# Patient Record
Sex: FEMALE | Race: WHITE | NOT HISPANIC OR LATINO
[De-identification: names, ages, dates, MRNs, and addresses within clinical notes are randomized per-mention and may not be internally consistent; named-entity substitution may affect disease eponyms.]

---

## 2022-08-15 DIAGNOSIS — Z83.3 FAMILY HISTORY OF DIABETES MELLITUS: ICD-10-CM

## 2022-08-15 DIAGNOSIS — I10 ESSENTIAL (PRIMARY) HYPERTENSION: ICD-10-CM

## 2022-08-15 DIAGNOSIS — Z82.61 FAMILY HISTORY OF ARTHRITIS: ICD-10-CM

## 2022-08-15 DIAGNOSIS — Z86.73 PERSONAL HISTORY OF TRANSIENT ISCHEMIC ATTACK (TIA), AND CEREBRAL INFARCTION W/OUT RESIDUAL DEFICITS: ICD-10-CM

## 2022-08-15 DIAGNOSIS — M19.90 UNSPECIFIED OSTEOARTHRITIS, UNSPECIFIED SITE: ICD-10-CM

## 2022-08-15 DIAGNOSIS — Z98.890 OTHER SPECIFIED POSTPROCEDURAL STATES: ICD-10-CM

## 2022-08-15 DIAGNOSIS — Z82.49 FAMILY HISTORY OF ISCHEMIC HEART DISEASE AND OTHER DISEASES OF THE CIRCULATORY SYSTEM: ICD-10-CM

## 2022-08-15 DIAGNOSIS — Q82.8 OTHER SPECIFIED CONGENITAL MALFORMATIONS OF SKIN: ICD-10-CM

## 2022-08-15 DIAGNOSIS — M33.20 POLYMYOSITIS, ORGAN INVOLVEMENT UNSPECIFIED: ICD-10-CM

## 2022-08-15 PROBLEM — Z00.00 ENCOUNTER FOR PREVENTIVE HEALTH EXAMINATION: Status: ACTIVE | Noted: 2022-08-15

## 2022-08-15 RX ORDER — ROSUVASTATIN CALCIUM 5 MG/1
5 TABLET, FILM COATED ORAL
Refills: 0 | Status: ACTIVE | COMMUNITY

## 2022-08-15 RX ORDER — PRAVASTATIN SODIUM 80 MG/1
80 TABLET ORAL
Refills: 0 | Status: ACTIVE | COMMUNITY

## 2022-08-15 RX ORDER — INSULIN GLARGINE 100 [IU]/ML
100 INJECTION, SOLUTION SUBCUTANEOUS
Refills: 0 | Status: ACTIVE | COMMUNITY

## 2022-08-15 RX ORDER — CARVEDILOL 25 MG/1
25 TABLET, FILM COATED ORAL
Refills: 0 | Status: ACTIVE | COMMUNITY

## 2022-08-15 RX ORDER — LISINOPRIL 20 MG/1
20 TABLET ORAL
Refills: 0 | Status: ACTIVE | COMMUNITY

## 2022-08-15 RX ORDER — GABAPENTIN 300 MG
300 TABLET ORAL
Refills: 0 | Status: ACTIVE | COMMUNITY

## 2022-08-15 RX ORDER — AMLODIPINE BESYLATE 10 MG/1
10 TABLET ORAL
Refills: 0 | Status: ACTIVE | COMMUNITY

## 2022-08-15 RX ORDER — HYDROCHLOROTHIAZIDE 25 MG/1
25 TABLET ORAL
Refills: 0 | Status: ACTIVE | COMMUNITY

## 2022-08-15 RX ORDER — INSULIN LISPRO 100 [IU]/ML
100 INJECTION, SOLUTION INTRAVENOUS; SUBCUTANEOUS
Refills: 0 | Status: ACTIVE | COMMUNITY

## 2022-08-25 ENCOUNTER — APPOINTMENT (OUTPATIENT)
Dept: PODIATRY | Facility: CLINIC | Age: 77
End: 2022-08-25

## 2023-02-20 ENCOUNTER — OFFICE (OUTPATIENT)
Dept: URBAN - METROPOLITAN AREA CLINIC 90 | Facility: CLINIC | Age: 78
Setting detail: OPHTHALMOLOGY
End: 2023-02-20
Payer: MEDICARE

## 2023-02-20 DIAGNOSIS — H43.393: ICD-10-CM

## 2023-02-20 DIAGNOSIS — H40.013: ICD-10-CM

## 2023-02-20 DIAGNOSIS — E11.9: ICD-10-CM

## 2023-02-20 DIAGNOSIS — H43.811: ICD-10-CM

## 2023-02-20 DIAGNOSIS — H35.373: ICD-10-CM

## 2023-02-20 DIAGNOSIS — H16.223: ICD-10-CM

## 2023-02-20 PROCEDURE — 92014 COMPRE OPH EXAM EST PT 1/>: CPT | Performed by: OPHTHALMOLOGY

## 2023-02-20 ASSESSMENT — REFRACTION_MANIFEST
OD_AXIS: 105
OD_SPHERE: +0.25
OS_CYLINDER: -0.75
OD_VA1: 20/20-
OD_VA2: 20/20
OD_ADD: +2.50
OS_CYLINDER: -0.50
OD_AXIS: 102
OD_CYLINDER: -1.50
OD_SPHERE: +0.50
OS_ADD: +2.50
OD_SPHERE: +0.75
OD_AXIS: 105
OS_CYLINDER: -0.50
OS_AXIS: 086
OS_SPHERE: PLANO
OS_SPHERE: PLANO
OD_CYLINDER: -1.25
OS_VA2: 20/25(J1)
OD_CYLINDER: -1.75
OS_AXIS: 090
OS_AXIS: 080
OD_CYLINDER: -1.25
OD_VA2: 20/25(J1)
OS_VA1: 20/20
OS_AXIS: 090
OS_SPHERE: +0.25
OD_VA1: 20/25
OD_VA1: 20/20
OD_AXIS: 100
OS_VA2: 20/J1
OD_SPHERE: +0.50
OD_ADD: +2.50
OD_ADD: +2.75
OS_VA1: 20/20
OS_CYLINDER: -0.75
OS_VA2: 20/20
OS_VA1: 20/20
OS_ADD: +2.75
OS_SPHERE: PLANO
OD_VA2: 20/J1
OS_ADD: +2.50

## 2023-02-20 ASSESSMENT — AXIALLENGTH_DERIVED
OD_AL: 24.6502
OD_AL: 24.5972
OS_AL: 24.4917
OD_AL: 24.6502
OD_AL: 24.5972
OS_AL: 24.6502
OD_AL: 24.8647

## 2023-02-20 ASSESSMENT — REFRACTION_CURRENTRX
OS_VPRISM_DIRECTION: PROGS
OS_VPRISM_DIRECTION: PROGS
OS_SPHERE: PLANO
OD_AXIS: 101
OD_CYLINDER: -1.25
OS_AXIS: 085
OD_VPRISM_DIRECTION: PROGS
OS_CYLINDER: -0.50
OD_OVR_VA: 20/
OD_SPHERE: +0.50
OD_ADD: +2.50
OD_SPHERE: +0.50
OD_ADD: +2.75
OS_OVR_VA: 20/
OS_ADD: +2.75
OD_CYLINDER: -1.25
OS_SPHERE: +0.25
OD_VPRISM_DIRECTION: PROGS
OS_CYLINDER: 0.00
OS_AXIS: 180
OS_OVR_VA: 20/
OS_ADD: +2.50
OD_AXIS: 104
OD_OVR_VA: 20/

## 2023-02-20 ASSESSMENT — SPHEQUIV_DERIVED
OS_SPHEQUIV: 0.25
OD_SPHEQUIV: 0
OD_SPHEQUIV: -0.625
OS_SPHEQUIV: -0.125
OD_SPHEQUIV: -0.125
OD_SPHEQUIV: -0.125
OD_SPHEQUIV: 0

## 2023-02-20 ASSESSMENT — DRY EYES - PHYSICIAN NOTES
OD_GENERALCOMMENTS: INFERIORLY
OS_GENERALCOMMENTS: INFERIORLY

## 2023-02-20 ASSESSMENT — PACHYMETRY
OD_CT_CORRECTION: 1
OS_CT_CORRECTION: 1
OD_CT_UM: 526
OS_CT_UM: 525

## 2023-02-20 ASSESSMENT — LID EXAM ASSESSMENTS
OS_BLEPHARITIS: LLL T 1+
OD_BLEPHARITIS: RLL T 1+

## 2023-02-20 ASSESSMENT — KERATOMETRY
OD_K1POWER_DIOPTERS: 40.50
METHOD_AUTO_MANUAL: AUTO
OD_AXISANGLE_DEGREES: 015
OS_K1POWER_DIOPTERS: 40.50
OS_K2POWER_DIOPTERS: 41.25
OS_AXISANGLE_DEGREES: 169
OD_K2POWER_DIOPTERS: 41.25

## 2023-02-20 ASSESSMENT — TONOMETRY
OS_IOP_MMHG: 20
OD_IOP_MMHG: 20

## 2023-02-20 ASSESSMENT — REFRACTION_AUTOREFRACTION
OS_AXIS: 092
OD_AXIS: 107
OS_CYLINDER: -1.50
OD_CYLINDER: -2.00
OS_SPHERE: +1.00
OD_SPHERE: +1.00

## 2023-02-20 ASSESSMENT — SUPERFICIAL PUNCTATE KERATITIS (SPK)
OS_SPK: 1+
OD_SPK: 1+

## 2023-02-20 ASSESSMENT — TEAR BREAK UP TIME (TBUT)
OD_TBUT: T
OS_TBUT: T

## 2023-02-20 ASSESSMENT — VISUAL ACUITY
OS_BCVA: 20/20
OD_BCVA: 20/30-1

## 2023-02-20 ASSESSMENT — CONFRONTATIONAL VISUAL FIELD TEST (CVF)
OS_FINDINGS: FULL
OD_FINDINGS: FULL

## 2023-05-26 ENCOUNTER — APPOINTMENT (OUTPATIENT)
Dept: PODIATRY | Facility: CLINIC | Age: 78
End: 2023-05-26
Payer: MEDICARE

## 2023-05-26 DIAGNOSIS — M33.20 POLYMYOSITIS, ORGAN INVOLVEMENT UNSPECIFIED: ICD-10-CM

## 2023-05-26 PROCEDURE — 11721 DEBRIDE NAIL 6 OR MORE: CPT

## 2023-05-26 RX ORDER — METHOTREXATE 2.5 MG/1
TABLET ORAL
Refills: 0 | Status: ACTIVE | COMMUNITY

## 2023-06-05 NOTE — PHYSICAL EXAM
[Ankle Swelling (On Exam)] : present [Ankle Swelling Bilaterally] : bilaterally  [1+] : left foot dorsalis pedis 1+ [FreeTextEntry3] : CFT: 3 seconds x10. Negative hair growth. Atrophic dry skin bilaterally.  [FreeTextEntry4] : Decreased vibratory at the 1st MPJ's and medial malleoli. [FreeTextEntry8] : Decreased vibratory at the 1st MPJ's and medial malleoli. [FreeTextEntry1] : [place]-Susan monofilament testing dimiinshed at the hallux bilaterally and lesser digits bilaterally.

## 2023-06-05 NOTE — ASSESSMENT
[FreeTextEntry1] : \par Impression: Onychomycosis x10. Diabetic with peripheral neuropathy.\par \par Treatment: Patient was educated on findings and conditions. With patient's consent all nails were prepped and manually and mechanically debrided to patient's tolerance without incidence. Discussed antifungal topical medications with the patient as an option for treatment. I would not recommend oral or laser at this time. Patient would like to try topical medications, antifungal Ciclopirox solution was sent to the pharmacy and instructions on use were given. She was advised to soak all nails in a one to one mixture of white vinegar and water and cleanse for approximately 10 to 15 minutes every day. Cleanse lightly and gently with a nail brush and dry well. Apply antifungal medications in the evenings. Patient is to avoid walking barefoot. Proper shoe gear was discussed with the patient. Any problems or concerns she is to contact the office.

## 2023-07-25 ENCOUNTER — OFFICE (OUTPATIENT)
Dept: URBAN - METROPOLITAN AREA CLINIC 90 | Facility: CLINIC | Age: 78
Setting detail: OPHTHALMOLOGY
End: 2023-07-25
Payer: MEDICARE

## 2023-07-25 DIAGNOSIS — H40.013: ICD-10-CM

## 2023-07-25 DIAGNOSIS — H18.593: ICD-10-CM

## 2023-07-25 DIAGNOSIS — H43.811: ICD-10-CM

## 2023-07-25 DIAGNOSIS — H01.002: ICD-10-CM

## 2023-07-25 DIAGNOSIS — H43.393: ICD-10-CM

## 2023-07-25 DIAGNOSIS — H35.373: ICD-10-CM

## 2023-07-25 DIAGNOSIS — H16.223: ICD-10-CM

## 2023-07-25 DIAGNOSIS — H01.005: ICD-10-CM

## 2023-07-25 DIAGNOSIS — E11.9: ICD-10-CM

## 2023-07-25 DIAGNOSIS — H18.591: ICD-10-CM

## 2023-07-25 DIAGNOSIS — H18.592: ICD-10-CM

## 2023-07-25 PROCEDURE — 92083 EXTENDED VISUAL FIELD XM: CPT | Performed by: OPHTHALMOLOGY

## 2023-07-25 PROCEDURE — 92250 FUNDUS PHOTOGRAPHY W/I&R: CPT | Performed by: OPHTHALMOLOGY

## 2023-07-25 PROCEDURE — 92014 COMPRE OPH EXAM EST PT 1/>: CPT | Performed by: OPHTHALMOLOGY

## 2023-07-25 ASSESSMENT — REFRACTION_MANIFEST
OS_VA2: 20/20
OD_AXIS: 102
OS_SPHERE: PLANO
OD_ADD: +2.75
OS_ADD: +2.50
OD_ADD: +2.50
OS_AXIS: 090
OS_CYLINDER: -0.50
OD_CYLINDER: -1.25
OD_AXIS: 105
OD_CYLINDER: -1.75
OS_VA1: 20/20
OS_AXIS: 090
OS_AXIS: 080
OD_AXIS: 100
OD_CYLINDER: -1.50
OD_SPHERE: +0.75
OD_VA1: 20/25
OD_VA2: 20/J1
OD_SPHERE: +0.50
OD_SPHERE: +0.25
OS_AXIS: 086
OD_VA2: 20/25(J1)
OS_SPHERE: +0.25
OS_VA2: 20/25(J1)
OD_SPHERE: +0.50
OS_ADD: +2.75
OS_VA2: 20/J1
OD_CYLINDER: -1.75
OS_VA2: 20/20
OS_SPHERE: PLANO
OS_ADD: +2.75
OD_ADD: +2.75
OD_VA2: 20/20
OD_AXIS: 105
OD_CYLINDER: -1.25
OD_ADD: +2.50
OS_CYLINDER: -0.50
OS_CYLINDER: -0.75
OD_VA1: 20/20-
OS_VA1: 20/20
OD_VA1: 20/20-
OS_AXIS: 080
OS_SPHERE: PLANO
OS_VA1: 20/20
OS_CYLINDER: -0.75
OS_ADD: +2.50
OD_VA1: 20/20
OS_VA1: 20/20
OD_AXIS: 100
OS_SPHERE: PLANO
OD_SPHERE: +0.25
OD_VA2: 20/20
OS_CYLINDER: -0.75

## 2023-07-25 ASSESSMENT — SPHEQUIV_DERIVED
OS_SPHEQUIV: -0.125
OD_SPHEQUIV: -0.125
OD_SPHEQUIV: -0.625
OD_SPHEQUIV: 0
OD_SPHEQUIV: -0.125
OS_SPHEQUIV: 0.125
OD_SPHEQUIV: 0
OD_SPHEQUIV: -0.625

## 2023-07-25 ASSESSMENT — REFRACTION_AUTOREFRACTION
OD_SPHERE: +0.75
OS_CYLINDER: -1.25
OD_AXIS: 105
OS_SPHERE: +0.75
OD_CYLINDER: -1.50
OS_AXIS: 088

## 2023-07-25 ASSESSMENT — LID EXAM ASSESSMENTS
OD_COMMENTS: NO FOREIGN BODY, NO CONCRETIONS/CALCIFICATIONS.
OD_COMMENTS: LID EVERSION RUL
OS_BLEPHARITIS: LLL T 1+
OD_BLEPHARITIS: RLL T 1+

## 2023-07-25 ASSESSMENT — REFRACTION_CURRENTRX
OD_AXIS: 101
OS_VPRISM_DIRECTION: PROGS
OS_OVR_VA: 20/
OD_OVR_VA: 20/
OD_SPHERE: +0.50
OD_CYLINDER: -1.25
OD_ADD: +2.75
OD_CYLINDER: -1.25
OD_VPRISM_DIRECTION: PROGS
OS_OVR_VA: 20/
OS_VPRISM_DIRECTION: PROGS
OS_ADD: +2.50
OS_ADD: +2.75
OS_SPHERE: PLANO
OD_VPRISM_DIRECTION: PROGS
OS_AXIS: 180
OD_ADD: +2.50
OD_OVR_VA: 20/
OS_CYLINDER: -0.50
OS_SPHERE: +0.25
OS_AXIS: 085
OD_SPHERE: +0.50
OS_CYLINDER: 0.00
OD_AXIS: 104

## 2023-07-25 ASSESSMENT — SUPERFICIAL PUNCTATE KERATITIS (SPK)
OD_SPK: 1+
OS_SPK: 1+

## 2023-07-25 ASSESSMENT — KERATOMETRY
OS_K1POWER_DIOPTERS: 40.25
OS_AXISANGLE_DEGREES: 168
OD_AXISANGLE_DEGREES: 017
OS_K2POWER_DIOPTERS: 41.00
OD_K1POWER_DIOPTERS: 40.50
OD_K2POWER_DIOPTERS: 41.25
METHOD_AUTO_MANUAL: AUTO

## 2023-07-25 ASSESSMENT — DRY EYES - PHYSICIAN NOTES
OD_GENERALCOMMENTS: INFERIORLY
OS_GENERALCOMMENTS: INFERIORLY

## 2023-07-25 ASSESSMENT — TEAR BREAK UP TIME (TBUT)
OD_TBUT: T
OS_TBUT: T

## 2023-07-25 ASSESSMENT — PACHYMETRY
OD_CT_CORRECTION: 1
OD_CT_UM: 526
OS_CT_UM: 525
OS_CT_CORRECTION: 1

## 2023-07-25 ASSESSMENT — TONOMETRY
OD_IOP_MMHG: 20
OS_IOP_MMHG: 20

## 2023-07-25 ASSESSMENT — VISUAL ACUITY
OD_BCVA: 20/25
OS_BCVA: 20/25-

## 2023-07-25 ASSESSMENT — AXIALLENGTH_DERIVED
OD_AL: 24.8647
OS_AL: 24.6441
OD_AL: 24.5972
OD_AL: 24.6502
OD_AL: 24.5972
OD_AL: 24.8647
OS_AL: 24.7509
OD_AL: 24.6502

## 2023-07-25 ASSESSMENT — CONFRONTATIONAL VISUAL FIELD TEST (CVF)
OD_FINDINGS: FULL
OS_FINDINGS: FULL

## 2023-10-16 ENCOUNTER — APPOINTMENT (OUTPATIENT)
Dept: PODIATRY | Facility: CLINIC | Age: 78
End: 2023-10-16
Payer: MEDICARE

## 2023-10-16 DIAGNOSIS — S90.522A BLISTER (NONTHERMAL), LEFT ANKLE, INITIAL ENCOUNTER: ICD-10-CM

## 2023-10-16 DIAGNOSIS — M25.571 PAIN IN RIGHT ANKLE AND JOINTS OF RIGHT FOOT: ICD-10-CM

## 2023-10-16 DIAGNOSIS — M79.671 PAIN IN RIGHT FOOT: ICD-10-CM

## 2023-10-16 DIAGNOSIS — L03.115 CELLULITIS OF RIGHT LOWER LIMB: ICD-10-CM

## 2023-10-16 PROCEDURE — 73610 X-RAY EXAM OF ANKLE: CPT | Mod: RT

## 2023-10-16 PROCEDURE — 99212 OFFICE O/P EST SF 10 MIN: CPT | Mod: 25

## 2023-10-19 PROBLEM — M79.671 RIGHT FOOT PAIN: Status: ACTIVE | Noted: 2023-10-19

## 2023-10-19 PROBLEM — M25.571 ACUTE RIGHT ANKLE PAIN: Status: ACTIVE | Noted: 2023-10-19

## 2023-10-19 LAB — GRAM STN SPEC: NORMAL

## 2023-10-24 LAB — BACTERIA WND CULT: NORMAL

## 2023-11-27 ENCOUNTER — APPOINTMENT (OUTPATIENT)
Dept: PODIATRY | Facility: CLINIC | Age: 78
End: 2023-11-27
Payer: MEDICARE

## 2023-11-27 PROCEDURE — 11721 DEBRIDE NAIL 6 OR MORE: CPT

## 2024-02-26 ENCOUNTER — APPOINTMENT (OUTPATIENT)
Dept: PODIATRY | Facility: CLINIC | Age: 79
End: 2024-02-26
Payer: MEDICARE

## 2024-02-26 DIAGNOSIS — L85.3 XEROSIS CUTIS: ICD-10-CM

## 2024-02-26 PROCEDURE — 11721 DEBRIDE NAIL 6 OR MORE: CPT

## 2024-02-26 PROCEDURE — 99212 OFFICE O/P EST SF 10 MIN: CPT | Mod: 25

## 2024-02-28 PROBLEM — L85.3 XEROSIS CUTIS: Status: ACTIVE | Noted: 2024-02-28

## 2024-03-01 NOTE — ASSESSMENT
[FreeTextEntry1] : Impression: Diabetic with diabetic neuropathy. Onychomycosis. Xerosis.  Treatment: Discussed findings and conditions with the patient. The patient states she is unable to soak her toes in white vinegar at this time due to the discomfort in her back. She is ambulating with a cane. With patient's consent, all nails were prepped and manually and mechanically debrided to patient's tolerance without incidence.  She is encouraged to moisturize the bottom of the feet if she can and also help with family. Patient is to return in approximately 2 to 3 months. If there is any pain, problems or concerns, or redness she is to contact the office. She was also encouraged to continue moisturizing the bottom of both feet with Vaseline type product or Aquaphor to help moisturize her skin.

## 2024-03-01 NOTE — HISTORY OF PRESENT ILLNESS
[FreeTextEntry1] : Patient presents today for cutting of thick, mycotic, elongated nails which he states she cannot cut herself. She states they are painful when in shoe gear.  She denies any other pedal complaints at this time. She recently is being worked up with an MRI of the lower back which found stenosis and radiculopathy in the lower extremities. The patient states that several weeks ago she had fallen as her legs had given out in the house.  Patient denies any other additional trauma to the feet at this time. Patient states her fasting blood sugar today is 180. She is unsure of her most recent hemoglobin A1c. 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

## 2024-03-01 NOTE — PHYSICAL EXAM
[1+] : left foot dorsalis pedis 1+ [FreeTextEntry3] : CFT: 3 seconds x10 Negative hair growth. Skin turgor is slightly decreased bilaterally.  [de-identified] : Muscle strength 4/5 bilateral. There is some weakness in dorsilfexion bilateral ankle joints. There are hammertoe deformities 2 to 4 bilateral. [FreeTextEntry4] : Decreased vibratory at the 1st MPJ's and medial malleoli. [FreeTextEntry1] : [place]-Susan monofilament testing dimiinshed at the hallux bilaterally and lesser digits bilaterally.  [FreeTextEntry8] : Decreased vibratory at the 1st MPJ's and medial malleoli.

## 2024-05-31 ENCOUNTER — APPOINTMENT (OUTPATIENT)
Dept: PODIATRY | Facility: CLINIC | Age: 79
End: 2024-05-31
Payer: MEDICARE

## 2024-05-31 DIAGNOSIS — E11.40 TYPE 2 DIABETES MELLITUS WITH DIABETIC NEUROPATHY, UNSPECIFIED: ICD-10-CM

## 2024-05-31 DIAGNOSIS — B35.3 TINEA PEDIS: ICD-10-CM

## 2024-05-31 DIAGNOSIS — B35.1 TINEA UNGUIUM: ICD-10-CM

## 2024-05-31 PROCEDURE — 99213 OFFICE O/P EST LOW 20 MIN: CPT | Mod: 25

## 2024-05-31 PROCEDURE — 11721 DEBRIDE NAIL 6 OR MORE: CPT

## 2024-06-03 PROBLEM — B35.1 ONYCHOMYCOSIS: Status: ACTIVE | Noted: 2022-08-15

## 2024-06-03 PROBLEM — E11.40 TYPE 2 DIABETES MELLITUS WITH DIABETIC NEUROPATHY: Status: ACTIVE | Noted: 2022-08-15

## 2024-06-03 PROBLEM — B35.3 TINEA PEDIS: Status: ACTIVE | Noted: 2022-08-15

## 2024-06-03 RX ORDER — CICLOPIROX OLAMINE 7.7 MG/G
0.77 CREAM TOPICAL DAILY
Qty: 15 | Refills: 2 | Status: ACTIVE | COMMUNITY
Start: 2024-06-03 | End: 1900-01-01

## 2024-06-07 NOTE — ASSESSMENT
[FreeTextEntry1] : Impression: Diabetic with diabetic neuropathy (E11.40).  Onychomycosis (B35.1).  Tinea pedis (B35.3).  Treatment: Discussed findings and conditions with the patient. Discussed diabetic pedal care.  Again, she was encouraged to soak toes in a 1:1 mixture of white vinegar and warm water for 10 minutes.  She states that she is unable due to the situation with her back.   All nails were prepped and manually and mechanically debrided to patient's tolerance and appropriate length.  Nails were decreased in height and smoothed with a rotary jessica.   The offending portions of the nails were removed on the right hallux via distal slant back.  Antibiotic ointment was applied to the nail folds.  Eprescribed Ciclopirox cream to be applied twice daily to the bottoms of both feet for the next 2 -3 weeks.  Contact the office if there is no significant change for possible change in medication or need to continue at that time.   Return: 2 - 3 months.  With any pain, problems or concerns, patient is to contact the office.

## 2024-06-07 NOTE — HISTORY OF PRESENT ILLNESS
[Cane] : a cane [FreeTextEntry1] : Patient presents today ambulating with a cane for diabetic pedal care as well as elongated nails, which she cannot cut herself.  She is also concerned for plantar scaling on the bottom of both feet, which is new and as much cream that she applies, it does not help or resolve.  Denies any intermittent claudication pain or pain with ambulation but recently patient had a fall, in the house, which resulted in decrease in weakness in bilateral legs and there is compression on one of the nerves in her back.  She has been seen at \[facility]", had MRI's done and will potentially be going for epidurals to see if there are any additional recommendations along with, she states potentially need for low back surgery in the future.  Denies any changes in her medications or medical conditions.  Her last fingerstick was approximately 180 - 190.  She is unsure of her most recent hemoglobin A1c.

## 2024-06-07 NOTE — PHYSICAL EXAM
[Ankle Swelling (On Exam)] : present [Ankle Swelling Bilaterally] : bilaterally  [1+] : left foot dorsalis pedis 1+ [FreeTextEntry3] : CFT: 3 seconds x 10.   [de-identified] : Muscle strength 4/5 bilateral.  There is some weakness in dorsiflexion bilateral ankle joints.  There are hammertoe deformities 2 to 4 bilateral. [FreeTextEntry4] : decreased vibratory at the 1st MPJ and medial malleolus [FreeTextEntry8] : decreased vibratory at the 1st MPJ and medial malleolus [FreeTextEntry1] : [place]-Susan monofilament testing dimiinshed at the hallux bilaterally and lesser digits bilaterally.

## 2024-07-15 ENCOUNTER — OFFICE (OUTPATIENT)
Dept: URBAN - METROPOLITAN AREA CLINIC 90 | Facility: CLINIC | Age: 79
Setting detail: OPHTHALMOLOGY
End: 2024-07-15
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
## 2024-07-15 PROCEDURE — NO SHOW FE NO SHOW FEE: Performed by: OPHTHALMOLOGY

## 2024-07-16 ENCOUNTER — OFFICE (OUTPATIENT)
Dept: URBAN - METROPOLITAN AREA CLINIC 90 | Facility: CLINIC | Age: 79
Setting detail: OPHTHALMOLOGY
End: 2024-07-16
Payer: MEDICARE

## 2024-07-16 DIAGNOSIS — H40.013: ICD-10-CM

## 2024-07-16 DIAGNOSIS — H01.005: ICD-10-CM

## 2024-07-16 DIAGNOSIS — H52.4: ICD-10-CM

## 2024-07-16 DIAGNOSIS — H18.593: ICD-10-CM

## 2024-07-16 DIAGNOSIS — H01.002: ICD-10-CM

## 2024-07-16 PROCEDURE — 92083 EXTENDED VISUAL FIELD XM: CPT | Performed by: OPHTHALMOLOGY

## 2024-07-16 PROCEDURE — 92015 DETERMINE REFRACTIVE STATE: CPT | Performed by: OPHTHALMOLOGY

## 2024-07-16 PROCEDURE — 92014 COMPRE OPH EXAM EST PT 1/>: CPT | Performed by: OPHTHALMOLOGY

## 2024-07-16 PROCEDURE — 92133 CPTRZD OPH DX IMG PST SGM ON: CPT | Performed by: OPHTHALMOLOGY

## 2024-07-16 ASSESSMENT — CONFRONTATIONAL VISUAL FIELD TEST (CVF)
OD_FINDINGS: FULL
OS_FINDINGS: FULL

## 2024-07-16 ASSESSMENT — LID EXAM ASSESSMENTS
OD_BLEPHARITIS: RLL T 1+
OS_BLEPHARITIS: LLL T 1+
OD_COMMENTS: NO FOREIGN BODY, NO CONCRETIONS/CALCIFICATIONS.
OD_COMMENTS: LID EVERSION RUL

## 2024-08-30 ENCOUNTER — APPOINTMENT (OUTPATIENT)
Dept: PODIATRY | Facility: CLINIC | Age: 79
End: 2024-08-30

## 2024-08-30 DIAGNOSIS — B35.3 TINEA PEDIS: ICD-10-CM

## 2024-08-30 DIAGNOSIS — E11.40 TYPE 2 DIABETES MELLITUS WITH DIABETIC NEUROPATHY, UNSPECIFIED: ICD-10-CM

## 2024-08-30 DIAGNOSIS — B35.1 TINEA UNGUIUM: ICD-10-CM

## 2024-08-30 PROCEDURE — 99212 OFFICE O/P EST SF 10 MIN: CPT | Mod: 25

## 2024-08-30 PROCEDURE — 11721 DEBRIDE NAIL 6 OR MORE: CPT

## 2024-09-03 ENCOUNTER — OFFICE (OUTPATIENT)
Facility: LOCATION | Age: 79
Setting detail: OPHTHALMOLOGY
End: 2024-09-03
Payer: MEDICARE

## 2024-09-03 DIAGNOSIS — H01.005: ICD-10-CM

## 2024-09-03 DIAGNOSIS — H40.013: ICD-10-CM

## 2024-09-03 DIAGNOSIS — H16.223: ICD-10-CM

## 2024-09-03 DIAGNOSIS — H01.002: ICD-10-CM

## 2024-09-03 PROCEDURE — 99212 OFFICE O/P EST SF 10 MIN: CPT | Performed by: OPTOMETRIST

## 2024-09-03 ASSESSMENT — LID EXAM ASSESSMENTS
OS_BLEPHARITIS: LLL T 1+
OD_BLEPHARITIS: RLL T 1+
OD_COMMENTS: NO FOREIGN BODY, NO CONCRETIONS/CALCIFICATIONS.
OD_COMMENTS: LID EVERSION RUL

## 2024-09-05 NOTE — ASSESSMENT
[FreeTextEntry1] : Impression: Diabetic with neuropathy. Onychomycosis. Tinea pedis.  Treatment: Discussed findings and conditions with the patient. She is to continue with diabetic pedal care. Continue glycemic control. She is to discuss with her medical doctor as well regarding glycemic control vs. coming from her back, the increased tingling and numbness. She was encouraged to soak her feet in a one-to-one mixture of white vinegar and water for approximately 10 minutes and continue with the Ciclopirox external cream daily, twice a day. She states she has not been applying it as often and then she could discontinue for the next 2 to 3 weeks if there is a resolution of scaling. Discussed use and treatment to prevent skin fissures and potential risks of bacterial infection due to breaks in skin. All nails were prepped and manually debrided with sterile nippers. The nails were decreased in height and smoothed with a rotary jessica and subungual debris was curettaged. Offending nails were removed via distal slant-backs. Antibiotic ointment was applied to the area.  Patient is to contact the office with any redness, pain, problems or concerns. She is to follow-up in about 2 1/2 to 3 months.

## 2024-09-05 NOTE — PHYSICAL EXAM
[Ankle Swelling (On Exam)] : present [Ankle Swelling Bilaterally] : bilaterally  [1+] : left foot dorsalis pedis 1+ [FreeTextEntry3] : CFT: 3 seconds x10.  Temperature gradient warm to cool.  [de-identified] : Muscle strength 4/5 bilateral. There is some weakness in dorsiflexion bilateral ankle joints. There are hammertoe deformities 2 to 4 bilateral. [FreeTextEntry4] : Decreased vibratory at the 1st MPJ and medial malleoli. [FreeTextEntry8] : Decreased vibratory at the 1st MPJ and medial malleoli. [FreeTextEntry1] : [place]-Susan monofilament testing dimiinshed at the hallux bilaterally and lesser digits bilaterally.

## 2024-09-05 NOTE — PHYSICAL EXAM
[Ankle Swelling (On Exam)] : present [Ankle Swelling Bilaterally] : bilaterally  [1+] : left foot dorsalis pedis 1+ [FreeTextEntry3] : CFT: 3 seconds x10.  Temperature gradient warm to cool.  [de-identified] : Muscle strength 4/5 bilateral. There is some weakness in dorsiflexion bilateral ankle joints. There are hammertoe deformities 2 to 4 bilateral. [FreeTextEntry4] : Decreased vibratory at the 1st MPJ and medial malleoli. [FreeTextEntry8] : Decreased vibratory at the 1st MPJ and medial malleoli. [FreeTextEntry1] : [place]-Susan monofilament testing dimiinshed at the hallux bilaterally and lesser digits bilaterally.

## 2024-09-05 NOTE — ASSESSMENT
[FreeTextEntry1] : Impression: Diabetic with neuropathy. Onychomycosis. Tinea pedis.  Treatment: Discussed findings and conditions with the patient. She is to continue with diabetic pedal care. Continue glycemic control. She is to discuss with her medical doctor as well regarding glycemic control vs. coming from her back, the increased tingling and numbness. She was encouraged to soak her feet in a one-to-one mixture of white vinegar and water for approximately 10 minutes and continue with the Ciclopirox external cream daily, twice a day. She states she has not been applying it as often and then she could discontinue for the next 2 to 3 weeks if there is a resolution of scaling. Discussed use and treatment to prevent skin fissures and potential risks of bacterial infection due to breaks in skin. All nails were prepped and manually debrided with sterile nippers. The nails were decreased in height and smoothed with a rotary ejssica and subungual debris was curettaged. Offending nails were removed via distal slant-backs. Antibiotic ointment was applied to the area.  Patient is to contact the office with any redness, pain, problems or concerns. She is to follow-up in about 2 1/2 to 3 months.

## 2024-09-05 NOTE — HISTORY OF PRESENT ILLNESS
[FreeTextEntry1] : Patient presents today. She ambulates with the assistance of a cane. She presents for diabetic pedal care and elongated nails, which she states she cannot cut herself. She states they are thickened and elongated. She has pain when in shoe gear. She denies any intermittent claudcation type symptoms but she recently states that she is unsure if it is her diabetes that is causing numbness and tingling in her feet more so recently, although her finger stick today was approximately 137. She does not know her most recall her most recent hemoglobin A1c and is unsure when she last saw her medical doctor.

## 2024-09-05 NOTE — PHYSICAL EXAM
[Ankle Swelling (On Exam)] : present [Ankle Swelling Bilaterally] : bilaterally  [1+] : left foot dorsalis pedis 1+ [FreeTextEntry3] : CFT: 3 seconds x10.  Temperature gradient warm to cool.  [de-identified] : Muscle strength 4/5 bilateral. There is some weakness in dorsiflexion bilateral ankle joints. There are hammertoe deformities 2 to 4 bilateral. [FreeTextEntry4] : Decreased vibratory at the 1st MPJ and medial malleoli. [FreeTextEntry8] : Decreased vibratory at the 1st MPJ and medial malleoli. [FreeTextEntry1] : [place]-Susan monofilament testing dimiinshed at the hallux bilaterally and lesser digits bilaterally.

## 2024-12-03 ENCOUNTER — OFFICE (OUTPATIENT)
Facility: LOCATION | Age: 79
Setting detail: OPHTHALMOLOGY
End: 2024-12-03
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
## 2024-12-03 PROCEDURE — NO SHOW FE NO SHOW FEE: Performed by: OPTOMETRIST

## 2025-03-20 ENCOUNTER — OFFICE (OUTPATIENT)
Dept: URBAN - METROPOLITAN AREA CLINIC 90 | Facility: CLINIC | Age: 80
Setting detail: OPHTHALMOLOGY
End: 2025-03-20
Payer: MEDICARE

## 2025-03-20 DIAGNOSIS — H43.811: ICD-10-CM

## 2025-03-20 DIAGNOSIS — H01.002: ICD-10-CM

## 2025-03-20 DIAGNOSIS — H01.005: ICD-10-CM

## 2025-03-20 DIAGNOSIS — H18.591: ICD-10-CM

## 2025-03-20 DIAGNOSIS — H43.393: ICD-10-CM

## 2025-03-20 DIAGNOSIS — H18.593: ICD-10-CM

## 2025-03-20 DIAGNOSIS — H52.7: ICD-10-CM

## 2025-03-20 DIAGNOSIS — H18.592: ICD-10-CM

## 2025-03-20 DIAGNOSIS — H16.223: ICD-10-CM

## 2025-03-20 DIAGNOSIS — H35.373: ICD-10-CM

## 2025-03-20 DIAGNOSIS — H40.013: ICD-10-CM

## 2025-03-20 DIAGNOSIS — E11.9: ICD-10-CM

## 2025-03-20 PROBLEM — H26.493 POSTERIOR CAPSULAR OPACIFICATION; BOTH EYES: Status: ACTIVE | Noted: 2025-03-20

## 2025-03-20 PROCEDURE — 92014 COMPRE OPH EXAM EST PT 1/>: CPT | Performed by: STUDENT IN AN ORGANIZED HEALTH CARE EDUCATION/TRAINING PROGRAM

## 2025-03-20 PROCEDURE — 92083 EXTENDED VISUAL FIELD XM: CPT | Performed by: STUDENT IN AN ORGANIZED HEALTH CARE EDUCATION/TRAINING PROGRAM

## 2025-03-20 PROCEDURE — 92250 FUNDUS PHOTOGRAPHY W/I&R: CPT | Performed by: STUDENT IN AN ORGANIZED HEALTH CARE EDUCATION/TRAINING PROGRAM

## 2025-03-20 PROCEDURE — 92015 DETERMINE REFRACTIVE STATE: CPT | Performed by: STUDENT IN AN ORGANIZED HEALTH CARE EDUCATION/TRAINING PROGRAM

## 2025-03-20 ASSESSMENT — REFRACTION_AUTOREFRACTION
OD_CYLINDER: -1.75
OD_SPHERE: +0.50
OD_AXIS: 099
OS_SPHERE: +0.50
OS_CYLINDER: -1.50
OS_AXIS: 086

## 2025-03-20 ASSESSMENT — REFRACTION_MANIFEST
OD_CYLINDER: -2.00
OS_ADD: +2.75
OS_ADD: +2.50
OS_AXIS: 090
OD_VA2: 20/J1
OS_AXIS: 085
OS_CYLINDER: -0.75
OS_VA1: 20/20
OD_SPHERE: +0.50
OS_CYLINDER: -1.25
OD_VA1: 20/20-
OD_VA1: 20/25
OD_CYLINDER: -2.00
OD_AXIS: 105
OS_AXIS: 086
OD_VA2: 20/20
OS_SPHERE: PLANO
OS_CYLINDER: -0.75
OS_CYLINDER: -0.75
OD_AXIS: 102
OD_VA2: 20/20
OD_VA1: 20/25
OS_VA1: 20/25+1
OD_CYLINDER: -1.75
OS_VA2: 20/25(J1)
OS_AXIS: 079
OD_SPHERE: +0.25
OS_VA2: 20/20
OD_AXIS: 100
OS_SPHERE: PLANO
OD_VA2: 20/25(J1)
OD_SPHERE: +0.50
OD_VA1: 20/25+1
OD_CYLINDER: -1.75
OD_AXIS: 100
OD_CYLINDER: -1.50
OS_CYLINDER: -1.25
OS_VA1: 20/20
OS_VA2: 20/20
OD_ADD: +2.50
OS_SPHERE: PLANO
OS_SPHERE: PLANO
OD_ADD: +2.75
OS_CYLINDER: -0.50
OS_VA2: 20/J1
OD_CYLINDER: -1.25
OD_SPHERE: PLANO
OD_VA1: 20/20-
OD_SPHERE: +0.50
OS_ADD: +2.75
OS_ADD: +2.75
OD_SPHERE: +0.25
OS_SPHERE: +0.25
OS_AXIS: 080
OS_VA1: 20/20
OS_VA1: 20/20
OS_AXIS: 090
OS_SPHERE: -0.25
OD_ADD: +2.75
OD_CYLINDER: -1.75
OD_AXIS: 100
OD_ADD: +2.50
OD_VA1: 20/20
OS_CYLINDER: -0.50
OD_AXIS: 105
OD_SPHERE: +0.75
OD_AXIS: 100
OS_AXIS: 080
OS_SPHERE: +0.25
OD_AXIS: 097
OD_CYLINDER: -1.25
OS_CYLINDER: -1.25
OS_VA1: 20/20
OS_AXIS: 085
OS_VA1: 20/25
OS_SPHERE: -0.25
OD_VA1: 20/20
OD_SPHERE: +0.50
OD_ADD: +2.75
OS_ADD: +2.50

## 2025-03-20 ASSESSMENT — REFRACTION_CURRENTRX
OD_SPHERE: PL
OD_ADD: +2.50
OD_ADD: +2.75
OS_VPRISM_DIRECTION: PROGS
OD_ADD: +2.25
OD_SPHERE: +0.50
OS_ADD: +2.75
OD_CYLINDER: -1.25
OD_OVR_VA: 20/
OS_CYLINDER: -1.25
OD_AXIS: 101
OS_CYLINDER: 0.00
OD_CYLINDER: -1.75
OD_SPHERE: +0.50
OD_CYLINDER: -1.25
OS_SPHERE: -0.25
OD_OVR_VA: 20/
OS_OVR_VA: 20/
OS_AXIS: 086
OS_ADD: +2.50
OD_OVR_VA: 20/
OD_VPRISM_DIRECTION: PROGS
OD_VPRISM_DIRECTION: PROGS
OS_AXIS: 180
OS_AXIS: 083
OD_AXIS: 117
OS_OVR_VA: 20/
OS_ADD: +2.25
OS_SPHERE: +0.50
OD_VPRISM_DIRECTION: PROGS
OS_CYLINDER: -1.50
OS_VPRISM_DIRECTION: PROGS
OD_AXIS: 103
OS_VPRISM_DIRECTION: PROGS
OS_OVR_VA: 20/
OS_SPHERE: PLANO

## 2025-03-20 ASSESSMENT — LID EXAM ASSESSMENTS
OD_COMMENTS: LID EVERSION RUL
OS_BLEPHARITIS: LLL T 1+
OD_BLEPHARITIS: RLL T 1+
OD_COMMENTS: NO FOREIGN BODY, NO CONCRETIONS/CALCIFICATIONS.

## 2025-03-20 ASSESSMENT — SUPERFICIAL PUNCTATE KERATITIS (SPK)
OS_SPK: 1+
OD_SPK: 1+

## 2025-03-20 ASSESSMENT — KERATOMETRY
OD_K2POWER_DIOPTERS: 41.75
OS_K1POWER_DIOPTERS: 40.50
OD_K1POWER_DIOPTERS: 40.50
METHOD_AUTO_MANUAL: MANUAL
OS_K2POWER_DIOPTERS: 41.25
OS_AXISANGLE_DEGREES: 171
OD_AXISANGLE_DEGREES: 011

## 2025-03-20 ASSESSMENT — TONOMETRY
OD_IOP_MMHG: 16
OS_IOP_MMHG: 16

## 2025-03-20 ASSESSMENT — PACHYMETRY
OD_CT_CORRECTION: 1
OS_CT_CORRECTION: 1
OD_CT_UM: 526
OS_CT_UM: 525

## 2025-03-20 ASSESSMENT — TEAR BREAK UP TIME (TBUT)
OD_TBUT: T
OS_TBUT: T

## 2025-03-20 ASSESSMENT — CONFRONTATIONAL VISUAL FIELD TEST (CVF)
OS_FINDINGS: FULL
OD_FINDINGS: FULL

## 2025-03-20 ASSESSMENT — VISUAL ACUITY
OS_BCVA: 20/30-1
OD_BCVA: 20/25-1

## 2025-03-20 ASSESSMENT — DRY EYES - PHYSICIAN NOTES
OS_GENERALCOMMENTS: INFERIORLY
OD_GENERALCOMMENTS: INFERIORLY

## 2025-07-14 ENCOUNTER — APPOINTMENT (OUTPATIENT)
Dept: PODIATRY | Facility: CLINIC | Age: 80
End: 2025-07-14
Payer: MEDICARE

## 2025-07-14 PROCEDURE — 99212 OFFICE O/P EST SF 10 MIN: CPT | Mod: 25

## 2025-07-14 PROCEDURE — 11721 DEBRIDE NAIL 6 OR MORE: CPT
